# Patient Record
(demographics unavailable — no encounter records)

---

## 2024-12-04 NOTE — DISCUSSION/SUMMARY
[FreeTextEntry1] : 1. DANE:  We reviewed management options for stress urinary incontinence including: observation, pelvic floor exercises, continence devices, periurethral bulking agents, and surgical management. She would like to continue with PFEs for now and will notify me if she desires further treatment.   2. OAB, UUI: -Continue behavioral and fluid modifications   RTO as needed

## 2025-03-06 NOTE — HISTORY OF PRESENT ILLNESS
[Patient reported colonoscopy was normal] : Patient reported colonoscopy was normal [FreeTextEntry1] : 2025. DONI SAN 50 year old female  LMP  presents for GSM f/u.  Pt has been using twice wkly Vagifem tabs - doing well with it, reports improvement in vaginal dryness. Also doing well on transdermal Estrogen patch and Progesterone QD for VMS.  Pt asks about cancer genetic testing, as her friend was recently dx'd with breast cancer.   Her mother had genetic testing in the last few years and was negative.   She denies abdominal and pelvic pain, no vaginal discharge or vaginitis symptoms. She reports normal urination, no dysuria. BM is normal per patient, no blood in stool or constipation/diarrhea.  Obhx:  MedHx: osteopenia, vaginal atrophy, urinary incontinence Meds: Relizen, magnesium, Vit D, transdermal estradiol patch, Progesterone 100mg QD ObHx:   x3- , , & GynHx: endo bx 2023 No Hx of STI, Fibroids, Ovarian cysts, abnl paps, or pelvic infections. SurgHx: meniscus surgery 2016 FamHx: Mother- likely DCIS onset 73 w/ lumpectomy (had full genetics panel- negative), BRCA neg, MGM pancreatic ca onset 69, Maternal great uncle x2 - pancreatic ca. Denies FHx of ovarian, uterine, colon, or prostate cancer. All: NKDA Social: denies drugs and tobacco, social alcohol, former smoker  [Mammogramdate] : 09/24 [TextBox_19] : BIRADS 1 [BreastSonogramDate] : 09/24 [TextBox_25] : BIRADS 1 [PapSmeardate] : 10/23 [TextBox_31] : NILM, HPV not detected [ColonoscopyDate] : 03/22 [TextBox_43] : UTD

## 2025-03-06 NOTE — PHYSICAL EXAM
[Chaperone Present] : A chaperone was present in the examining room during all aspects of the physical examination [Appropriately responsive] : appropriately responsive [Alert] : alert [No Acute Distress] : no acute distress [Oriented x3] : oriented x3 [FreeTextEntry2] : [facility])